# Patient Record
Sex: FEMALE | Race: BLACK OR AFRICAN AMERICAN | Employment: PART TIME | URBAN - METROPOLITAN AREA
[De-identification: names, ages, dates, MRNs, and addresses within clinical notes are randomized per-mention and may not be internally consistent; named-entity substitution may affect disease eponyms.]

---

## 2017-04-18 ENCOUNTER — APPOINTMENT (OUTPATIENT)
Dept: GENERAL RADIOLOGY | Age: 22
End: 2017-04-18
Attending: PHYSICIAN ASSISTANT
Payer: COMMERCIAL

## 2017-04-18 ENCOUNTER — HOSPITAL ENCOUNTER (EMERGENCY)
Age: 22
Discharge: HOME | End: 2017-04-18
Attending: EMERGENCY MEDICINE | Admitting: EMERGENCY MEDICINE
Payer: COMMERCIAL

## 2017-04-18 VITALS
DIASTOLIC BLOOD PRESSURE: 93 MMHG | HEIGHT: 59 IN | OXYGEN SATURATION: 100 % | HEART RATE: 82 BPM | RESPIRATION RATE: 18 BRPM | SYSTOLIC BLOOD PRESSURE: 134 MMHG | TEMPERATURE: 98.7 F | BODY MASS INDEX: 43.07 KG/M2 | WEIGHT: 213.63 LBS

## 2017-04-18 DIAGNOSIS — J06.9 ACUTE UPPER RESPIRATORY INFECTION: Primary | ICD-10-CM

## 2017-04-18 LAB
FLUAV AG NPH QL IA: NEGATIVE
FLUBV AG NOSE QL IA: NEGATIVE
HCG UR QL: NEGATIVE

## 2017-04-18 PROCEDURE — 74011250637 HC RX REV CODE- 250/637: Performed by: PHYSICIAN ASSISTANT

## 2017-04-18 PROCEDURE — 71020 XR CHEST PA LAT: CPT

## 2017-04-18 PROCEDURE — 81025 URINE PREGNANCY TEST: CPT

## 2017-04-18 PROCEDURE — 87804 INFLUENZA ASSAY W/OPTIC: CPT | Performed by: PHYSICIAN ASSISTANT

## 2017-04-18 PROCEDURE — 99283 EMERGENCY DEPT VISIT LOW MDM: CPT

## 2017-04-18 RX ORDER — IBUPROFEN 600 MG/1
600 TABLET ORAL
Status: COMPLETED | OUTPATIENT
Start: 2017-04-18 | End: 2017-04-18

## 2017-04-18 RX ORDER — ACETAMINOPHEN 325 MG/1
325 TABLET ORAL
Status: COMPLETED | OUTPATIENT
Start: 2017-04-18 | End: 2017-04-18

## 2017-04-18 RX ORDER — CODEINE PHOSPHATE AND GUAIFENESIN 10; 100 MG/5ML; MG/5ML
5 SOLUTION ORAL
Qty: 120 ML | Refills: 0 | Status: SHIPPED | OUTPATIENT
Start: 2017-04-18 | End: 2017-04-25

## 2017-04-18 RX ORDER — TRIPROLIDINE/PSEUDOEPHEDRINE 2.5MG-60MG
600 TABLET ORAL
Status: DISCONTINUED | OUTPATIENT
Start: 2017-04-18 | End: 2017-04-18

## 2017-04-18 RX ADMIN — ACETAMINOPHEN 325 MG: 325 TABLET, FILM COATED ORAL at 11:16

## 2017-04-18 RX ADMIN — IBUPROFEN 600 MG: 600 TABLET ORAL at 11:57

## 2017-04-18 NOTE — ED PROVIDER NOTES
The history is provided by the patient. No  was used. Arcelia Lerma is a 24 y.o. female who presents ambulatory  to [facility] ED, with PMH of none, c/o onset yesterday of 5/10 cough, body aches, chills and fatigue, \"burning up\" subjective temp, runny nose, slight sore throat and h/a. Patient has had recent influenza exposure. Patient has had no ABD pain, urinary s/s, rashes, or other specific c/o or concerns today. Patient has taken no meds for s/s. No chance pregnancy. PCP: PROVIDER UNKNOWN  Allergies; ASA  Social Hx: never tobacco, yes alcohol    There are no other complaints, changes or physical findings at this time. History reviewed. No pertinent past medical history. History reviewed. No pertinent surgical history. History reviewed. No pertinent family history. Social History     Social History    Marital status: SINGLE     Spouse name: N/A    Number of children: N/A    Years of education: N/A     Occupational History    Not on file. Social History Main Topics    Smoking status: Never Smoker    Smokeless tobacco: Never Used    Alcohol use Yes    Drug use: No    Sexual activity: Not on file     Other Topics Concern    Not on file     Social History Narrative         ALLERGIES: Aspirin    Review of Systems   Constitutional: Positive for chills, fatigue and fever. + subjective temp   HENT: Negative for rhinorrhea and sore throat.         + slight sore throat   Gastrointestinal: Negative for abdominal pain, nausea and vomiting. Genitourinary: Negative for dysuria, frequency and urgency. Musculoskeletal: Positive for myalgias. Skin: Negative for rash. Neurological: Positive for headaches. All other systems reviewed and are negative.       Vitals:    04/18/17 0950   BP: (!) 134/93   Pulse: 82   Resp: 18   Temp: 98.7 °F (37.1 °C)   SpO2: 100%   Weight: 96.9 kg (213 lb 10 oz)   Height: 4' 11\" (1.499 m)            Physical Exam   Constitutional: She is oriented to person, place, and time. She appears well-developed and well-nourished. No distress. 25 yo -American female   HENT:   Head: Normocephalic and atraumatic. Right Ear: Tympanic membrane, external ear and ear canal normal. No middle ear effusion. Left Ear: Tympanic membrane, external ear and ear canal normal.  No middle ear effusion. Nose: Rhinorrhea present. Right sinus exhibits no maxillary sinus tenderness and no frontal sinus tenderness. Left sinus exhibits no maxillary sinus tenderness and no frontal sinus tenderness. Mouth/Throat: Uvula is midline and oropharynx is clear and moist. No oropharyngeal exudate, posterior oropharyngeal edema or posterior oropharyngeal erythema. Eyes: Conjunctivae are normal. Right eye exhibits no discharge. Left eye exhibits no discharge. Neck: Normal range of motion. Neck supple. Cardiovascular: Normal rate, regular rhythm and normal heart sounds. No murmur heard. Pulmonary/Chest: Effort normal and breath sounds normal. No respiratory distress. She has no wheezes. Non-productive cough. Lymphadenopathy:     She has no cervical adenopathy. Neurological: She is alert and oriented to person, place, and time. Skin: Skin is warm and dry. She is not diaphoretic. Psychiatric: She has a normal mood and affect. Her behavior is normal.   Nursing note and vitals reviewed.        MDM  Number of Diagnoses or Management Options  Diagnosis management comments: DDx: influenza, bronchitis, PNA, URI    ED Course       Procedures     DISPOSITION:  11:58 AM    LABORATORY TESTS:  Recent Results (from the past 12 hour(s))   HCG URINE, QL. - POC    Collection Time: 04/18/17  9:54 AM   Result Value Ref Range    Pregnancy test,urine (POC) NEGATIVE  NEG     INFLUENZA A & B AG (RAPID TEST)    Collection Time: 04/18/17 11:15 AM   Result Value Ref Range    Influenza A Antigen NEGATIVE  NEG      Influenza B Antigen NEGATIVE  NEG         IMAGING RESULTS:  Ordering Provider: ALANNAH Waller Authorizing Provider: ALANNAH Waller   Ordering Phone: [phone] Authorizing Phone:     Ordering Fax: [phone] Attending Provider: Ricardo Saleh MD   Ordering Pager:   PCP: Provider Unknown      Other Ordering Provider:        Procedure: XR CHEST PA LAT [33091]    Procedure Date: 04/18/2017 11:33 AM   Accession Number: [id number]   Order Number: [id number]      Ordering Diagnosis:     Reason for Exam: cough   Performing Department: [facility] RADIOLOGY   Patient Class: EMERGENCY          Study Result      Exam: 2 view chest     Indication: Cough     PA and lateral views demonstrate normal heart size. There is no acute process in  the lung fields. The osseous structures are unremarkable.     IMPRESSION  Impression: No acute process.                    There are no end exam questions for this visit.         Signing Date/Time: 04/18/2017 11:34 AM   Signed by:  Natasha Chisholm MD   Interpreted/Read by: Natasha Chisholm MD        MEDICATIONS GIVEN:  Medications   acetaminophen (TYLENOL) tablet 325 mg (325 mg Oral Given 4/18/17 1116)   ibuprofen (MOTRIN) tablet 600 mg (600 mg Oral Given 4/18/17 1157)       IMPRESSION:  1. Acute upper respiratory infection        PLAN:  1. Narcotic precautions reviewed with patient prior to discharge  2. Push PO fluids  3. OTC cough/cold medications and tylenol/motrin for discomfort  4. Follow up with your PMD x 1 week; patient given PMD/clinic lists at discharge  Discharge Medication List as of 4/18/2017 11:50 AM      START taking these medications    Details   guaiFENesin-codeine (ROBITUSSIN AC) 100-10 mg/5 mL solution Take 5 mL by mouth four (4) times daily as needed for Cough for up to 7 days.  Max Daily Amount: 20 mL., Print, Disp-120 mL, R-0             Follow-up Information     Follow up With Details Comments Contact Info    Your PCP In 1 week As needed - Please use the attached list of facilities to locate one to meet your non-emergent medical needs         Return to ED if worse       This note is prepared by Анна Patino, acting as Scribe for [facility] [place] by [facility] YOEL SHRESTHA: The scribe's documentation has been prepared under my direction and personally reviewed by me in its entirety. I confirm that the note above accurately reflects all work, treatment, procedures, and medical decision making performed by me.

## 2017-04-18 NOTE — DISCHARGE INSTRUCTIONS
[facility] SYSTEMS Departments     For adult and child immunizations, family planning, TB screening, STD testing and women's health services. [place]: Zay [phone]      [place] [address]   [address]   [address]   170 [facility] [place] [address] [facility] [address]  [phone]      [address]          [address] [place] [address]     For primary care services, woman and child wellness, and some clinics providing specialty care. [facility] -- [address] [facility]. [facility]5 [facility] [phone]   411 [facility] 200 [facility] [address] [place] [address] [phone]   339 [facility] [address] [place] [address] [phone] [address]  [place] [address] [place] [address] 5850 [facility]  [phone]   [address] [place] [address] [phone]   [facility] 81 [facility] [phone]   [facility] [address] [place] [address] [phone]   [facility]: [facility] [address] [facility] [phone]     Tamia Álvarez [address] [facility] [address] [phone]   [facility] 5850 [facility]  [phone]   [facility]  [address], [address] [address] ([email or web address]) [phone]         Sexual Health/Woman Wellness Clinics    For STD/HIV testing and treatment, pregnancy testing and services, men's health, birth control services, LGBT services, and hepatitis/HPV vaccine services. [employer] for [place] [employer] [address] [facility] 75 [facility] 1579 [address] [place] [phone]   [facility] [address], 5th floor [phone]   Pregnancy [address] [facility] Women [address]  Carisa Longoria [phone]         Specialty Service 1709 [facility]   [phone]   [place]   [phone]   Women, Infant and Children's Services: [address] [phone] 600 [facility]   [phone]   [address]   [facility]     [phone]   [address] Crisis   [address] [phone]     Local Primary Care Physicians  [facility] [phone]  MD Janice Wahl MD Merrick Bruch, MD [facility] Doctors [phone]  Laxmi Tapia, FNP  MD Em Rocha, MD Damian Aragon [place] [address] [phone]  MD Stefan Mena MD [address] [facility] [phone]  Jacobo Reddish, MD Veda Litten, MD Arthur Olmos MD   [facility] [phone]  ARIAS CEDENO, MD Sylvester Godfrey, NP [address] [phone]  Radha Mena, MD Erlin Carlos, MD Otoniel Jaimes MD Izola Abrahams, MD Dorice Rick, MD Silver Guise, MD   85 67 [facility]  Park Jensen MD [facility] [phone]  MD Faye Rasheed, NP  Shanell Vargas, MD Leslie Mabry MD Glynda Alamin, MD Trudie Lango, MD   5260 [facility] [phone]  MD Anabel Sow, FNP  Nirmala So, NP  MD [address] MD Bo Basil, MD [place], MD [facility] [phone]  MD Susan Martinez MD Janus Ghee, MD Jonathan Quinones, MD Bubba Orozco MD   [address] [phone]  MD Lenore Traore MD [place] [phone]  MD Ariela Kaplan MD Arlyne Leach Vazquez [place], [address] [facility] [phone]  MD Haris Plummer MD Connie Police, MD Prosper Chaudhry MD Bernabe Richer, PAL Ybarra MD 1619 [facility]   [phone]  MD Turner Charles MD Harlin Railing, MD   2102 [facility] [phone]  MD Ronda Pham, MAHESH Chavez, YOEL Chavez, YOEL Alcocer MD Dovie Donalds, PAL Alvarez,  Miscellaneous:  Mike Childress -[phone]           Upper Respiratory Infection [facility]): Care Instructions  Your Care Instructions    An upper respiratory infection, or URI, is an infection of the nose, sinuses, or throat. URIs are spread by coughs, sneezes, and direct contact. The common cold is the most frequent kind of URI. The flu and sinus infections are other kinds of URIs. Almost all URIs are caused by viruses. Antibiotics won't cure them. But you can treat most infections with home care. This may include drinking lots of fluids and taking over-the-counter pain medicine. You will probably feel better in 4 to 10 days. The doctor has checked you carefully, but problems can develop later. If you notice any problems or new symptoms, get medical treatment right away. Follow-up care is a key part of your treatment and safety. Be sure to make and go to all appointments, and call your doctor if you are having problems. It's also a good idea to know your test results and keep a list of the medicines you take. How can you care for yourself at home? · To prevent dehydration, drink plenty of fluids, enough so that your urine is light yellow or clear like water. Choose water and other caffeine-free clear liquids until you feel better.  If you have kidney, heart, or liver disease and have to limit fluids, talk with your doctor before you increase the amount of fluids you drink. · Take an over-the-counter pain medicine, such as acetaminophen (Tylenol), ibuprofen (Advil, Motrin), or naproxen (Aleve). Read and follow all instructions on the label. · Before you use cough and cold medicines, check the label. These medicines may not be safe for young children or for people with certain health problems. · Be careful when taking over-the-counter cold or flu medicines and Tylenol at the same time. Many of these medicines have acetaminophen, which is Tylenol. Read the labels to make sure that you are not taking more than the recommended dose. Too much acetaminophen (Tylenol) can be harmful. · Get plenty of rest.  · Do not smoke or allow others to smoke around you. If you need help quitting, talk to your doctor about stop-smoking programs and medicines. These can increase your chances of quitting for good. When should you call for help? Call 911 anytime you think you may need emergency care. For example, call if:  · You have severe trouble breathing. Call your doctor now or seek immediate medical care if:  · You seem to be getting much sicker. · You have new or worse trouble breathing. · You have a new or higher fever. · You have a new rash. Watch closely for changes in your health, and be sure to contact your doctor if:  · You have a new symptom, such as a sore throat, an earache, or sinus pain. · You cough more deeply or more often, especially if you notice more mucus or a change in the color of your mucus. · You do not get better as expected. Where can you learn more? Go to http://ivanna-sera.info/. Enter Y791 in the search box to learn more about \"Upper Respiratory Infection (Cold): Care Instructions. \"  Current as of: June 30, 2016  Content Version: 11.2  © 5977-3199 [employer], [employer]. Care instructions adapted under license by [employer] (which disclaims liability or warranty for this information).  If you have questions about a medical condition or this instruction, always ask your healthcare professional. [place] [address] any warranty or liability for your use of this information.

## 2017-04-18 NOTE — ED NOTES
PA has reviewed discharge instructions with the patient. The patient verbalized understanding. Patient discharged ambulatory.

## 2017-04-18 NOTE — ED NOTES
Patient presents ambulatory to ED with cough, chills, body aches and fatigue that started yesterday morning.

## 2023-10-05 NOTE — LETTER
Medicare Wellness Visit  Plan for Preventive Care    A good way for you to stay healthy is to use preventive care.  Medicare covers many services that can help you stay healthy.* The goal of these services is to find any health problems as quickly as possible. Finding problems early can help make them easier to treat.  Your personal plan below lists the services you may need and when they are due.      Health Maintenance Summary     Shingles Vaccine (1 of 2)  Overdue - never done    COVID-19 Vaccine (3 - Booster for Ramo series)  Overdue since 12/30/2021    Medicare Advantage- Medicare Wellness Visit (Yearly - January to December)  Overdue since 1/1/2023    Depression Screening (Yearly)  Next due on 4/14/2024    DM/CKD Microalbumin (Yearly)  Next due on 9/28/2024    DM/CKD GFR (Yearly)  Ordered on 9/28/2023    DTaP/Tdap/Td Vaccine (2 - Td or Tdap)  Next due on 6/7/2032    Colorectal Cancer Screen- (Colonoscopy - Every 10 Years)  Next due on 7/24/2033    Pneumococcal Vaccine 65+   Completed    Hepatitis C Screening   Completed    Abdominal Aortic Aneurysm (AAA) Screening   Completed    Influenza Vaccine   Completed    Meningococcal Vaccine   Aged Out    Hepatitis B Vaccine (For Physician/APC Discussion)   Aged Out    HPV Vaccine   Aged Out           Preventive Care for Women and Men    Heart Screenings (Cardiovascular):  · Blood tests are used to check your cholesterol, lipid and triglyceride levels. High levels can increase your risk for heart disease and stroke. High levels can be treated with medications, diet and exercise. Lowering your levels can help keep your heart and blood vessels healthy.  Your provider will order these tests if they are needed.    · An ultrasound is done to see if you have an abdominal aortic aneurysm (AAA).  This is an enlargement of one of the main blood vessels that delivers blood to the body.   In the [place], 9,000 deaths are caused by AAA.  You may not even know you have  [address] 
[facility] EMERGENCY DEPT 
[address] [place] [address] [address] [id number] [phone] Work/School Note Date: 4/18/2017 To Whom It May concern: 
 
Kwan Hayden was seen and treated today in the emergency room by the following provider(s): 
Attending Provider: Adelaide Piper MD 
Physician Assistant: ALANNAH Loving. Please excuse Kwan Hayden from work today and tomorrow. Sincerely, Hang Loving 
 
 
 
 this problem and as many as 1 in 3 people will have a serious problem if it is not treated.  Early diagnosis allows for more effective treatment and cure.  If you have a family history of AAA or are a male age 65-75 who has smoked, you are at higher risk of an AAA.  Your provider can order this test, if needed.    Colorectal Screening:  · There are many tests that are used to check for cancer of your colon and rectum. You and your provider should discuss what test is best for you and when to have it done.  Options include:  · Screening Colonoscopy: exam of the entire colon, seen through a flexible lighted tube.  · Flexible Sigmoidoscopy: exam of the last third (sigmoid portion) of the colon and rectum, seen through a flexible lighted tube.  · Cologuard DNA stool test: a sample of your stool is used to screen for cancer and unseen blood in your stool.  · Fecal Occult Blood Test: a sample of your stool is studied to find any unseen blood    Flu Shot:  · An immunization that helps to prevent influenza (the flu). You should get this every year. The best time to get the shot is in the fall.    Pneumococcal Shot:  • Vaccines help prevent pneumococcal disease, which is any type of illness caused by Streptococcus pneumoniae bacteria. There are two kinds of pneumococcal vaccines available in the [place]:   o Pneumococcal conjugate vaccines (PCV20 or Mailkiq51®)  o Pneumococcal polysaccharide vaccine (PPSV23 or Jwkzgczwh30®)  · For those who have never received any pneumococcal conjugate vaccine, CDC recommends PVC20 for adults 65 years or older and adults 19 through 64 years old with certain medical conditions or risk factors.   · For those who have previously received PCV13, this should be followed by a dose of PPSV23.     Hepatitis B Shot:  · An immunization that helps to protect people from getting Hepatitis B. Hepatitis B is a virus that spreads through contact with infected blood or body fluids. Many people with  the virus do not have symptoms.  The virus can lead to serious problems, such as liver disease. Some people are at higher risk than others. Your doctor will tell you if you need this shot.     Diabetes Screening:  · A test to measure sugar (glucose) in your blood is called a fasting blood sugar. Fasting means you cannot have food or drink for at least 8 hours before the test. This test can detect diabetes long before you may notice symptoms.    Glaucoma Screening:  · Glaucoma screening is performed by your eye doctor. The test measures the fluid pressure inside your eyes to determine if you have glaucoma.     Hepatitis C Screening:  · A blood test to see if you have the hepatitis C virus.  Hepatitis C attacks the liver and is a major cause of chronic liver disease.  Medicare will cover a single screening for all adults born between 1945 & 1965, or high risk patients (people who have injected illegal drugs or people who have had blood transfusions).  High risk patients who continue to inject illegal drugs can be screened for Hepatitis C every year.    Smoking and Tobacco-Use Cessation Counseling:  · Tobacco is the single greatest cause of disease and early death in our country today. Medication and counseling together can increase a person’s chance of quitting for good.   · Medicare covers two quitting attempts per year, with four counseling sessions per attempt (eight sessions in a 12 month period)    Preventive Screening tests for Women    Screening Mammograms and Breast Exams:  · An x-ray of your breasts to check for breast cancer before you or your doctor may be able to feel it.  If breast cancer is found early it can usually be treated with success.    Pelvic Exams and Pap Tests:  · An exam to check for cervical and vaginal cancer. A Pap test is a lab test in which cells are taken from your cervix and sent to the lab to look for signs of cervical cancer. If cancer of the cervix is found early, chances for a cure  are good. Testing can generally end at age 65, or if a woman has a hysterectomy for a benign condition. Your provider may recommend more frequent testing if certain abnormal results are found.    Bone Mass Measurements:  · A painless x-ray of your bone density to see if you are at risk for a broken bone. Bone density refers to the thickness of bones or how tightly the bone tissue is packed.    Preventive Screening tests for Men    Prostate Screening:  · Should you have a prostate cancer test (PSA)?  It is up to you to decide if you want a prostate cancer test. Talk to your clinician to find out if the test is right for you.  Things for you to consider and talk about should include:  · Benefits and harms of the test  · Your family history  · How your race/ethnicity may influence the test  · If the test may impact other medical conditions you have  · Your values on screenings and treatments    *Medicare pays for many preventive services to keep you healthy. For some of these services, you might have to pay a deductible, coinsurance, and / or copayment.  The amounts vary depending on the type of services you need and the kind of Medicare health plan you have.    For further details on screenings offered by Medicare please visit: https://www.medicare.gov/coverage/preventive-screening-services    Sepsis Criteria were met: